# Patient Record
Sex: MALE | Race: WHITE | NOT HISPANIC OR LATINO | Employment: FULL TIME | URBAN - METROPOLITAN AREA
[De-identification: names, ages, dates, MRNs, and addresses within clinical notes are randomized per-mention and may not be internally consistent; named-entity substitution may affect disease eponyms.]

---

## 2017-05-22 ENCOUNTER — HOSPITAL ENCOUNTER (EMERGENCY)
Facility: HOSPITAL | Age: 22
Discharge: HOME | End: 2017-05-22
Attending: EMERGENCY MEDICINE | Admitting: EMERGENCY MEDICINE

## 2017-05-22 ENCOUNTER — APPOINTMENT (OUTPATIENT)
Dept: CT IMAGING | Facility: HOSPITAL | Age: 22
End: 2017-05-22

## 2017-05-22 VITALS
SYSTOLIC BLOOD PRESSURE: 120 MMHG | TEMPERATURE: 98.2 F | RESPIRATION RATE: 16 BRPM | OXYGEN SATURATION: 100 % | BODY MASS INDEX: 26.51 KG/M2 | HEART RATE: 61 BPM | DIASTOLIC BLOOD PRESSURE: 81 MMHG | WEIGHT: 200 LBS | HEIGHT: 73 IN

## 2017-05-22 DIAGNOSIS — K62.5 BRBPR (BRIGHT RED BLOOD PER RECTUM): Primary | ICD-10-CM

## 2017-05-22 DIAGNOSIS — R19.5 HEME POSITIVE STOOL: ICD-10-CM

## 2017-05-22 LAB
ALBUMIN SERPL-MCNC: 4.7 G/DL (ref 3.2–4.8)
ALBUMIN/GLOB SERPL: 1.6 G/DL (ref 1.5–2.5)
ALP SERPL-CCNC: 60 U/L (ref 25–100)
ALT SERPL W P-5'-P-CCNC: 14 U/L (ref 7–40)
ANION GAP SERPL CALCULATED.3IONS-SCNC: 3 MMOL/L (ref 3–11)
AST SERPL-CCNC: 20 U/L (ref 0–33)
BASOPHILS # BLD AUTO: 0.01 10*3/MM3 (ref 0–0.2)
BASOPHILS NFR BLD AUTO: 0.1 % (ref 0–1)
BILIRUB SERPL-MCNC: 0.5 MG/DL (ref 0.3–1.2)
BUN BLD-MCNC: 18 MG/DL (ref 9–23)
BUN/CREAT SERPL: 15 (ref 7–25)
CALCIUM SPEC-SCNC: 10.1 MG/DL (ref 8.7–10.4)
CHLORIDE SERPL-SCNC: 106 MMOL/L (ref 99–109)
CO2 SERPL-SCNC: 32 MMOL/L (ref 20–31)
CREAT BLD-MCNC: 1.2 MG/DL (ref 0.6–1.3)
DEPRECATED RDW RBC AUTO: 41.8 FL (ref 37–54)
DEVELOPER EXPIRATION DATE: ABNORMAL
DEVELOPER LOT NUMBER: ABNORMAL
EOSINOPHIL # BLD AUTO: 0.11 10*3/MM3 (ref 0.1–0.3)
EOSINOPHIL NFR BLD AUTO: 1.5 % (ref 0–3)
ERYTHROCYTE [DISTWIDTH] IN BLOOD BY AUTOMATED COUNT: 13.1 % (ref 11.3–14.5)
EXPIRATION DATE: ABNORMAL
FECAL OCCULT BLOOD SCREEN, POC: POSITIVE
GFR SERPL CREATININE-BSD FRML MDRD: 74 ML/MIN/1.73
GLOBULIN UR ELPH-MCNC: 2.9 GM/DL
GLUCOSE BLD-MCNC: 89 MG/DL (ref 70–100)
HCT VFR BLD AUTO: 45.1 % (ref 38.9–50.9)
HGB BLD-MCNC: 15.3 G/DL (ref 13.1–17.5)
HOLD SPECIMEN: NORMAL
HOLD SPECIMEN: NORMAL
IMM GRANULOCYTES # BLD: 0 10*3/MM3 (ref 0–0.03)
IMM GRANULOCYTES NFR BLD: 0 % (ref 0–0.6)
LIPASE SERPL-CCNC: 37 U/L (ref 6–51)
LYMPHOCYTES # BLD AUTO: 1.97 10*3/MM3 (ref 0.6–4.8)
LYMPHOCYTES NFR BLD AUTO: 27.6 % (ref 24–44)
Lab: ABNORMAL
MCH RBC QN AUTO: 29.6 PG (ref 27–31)
MCHC RBC AUTO-ENTMCNC: 33.9 G/DL (ref 32–36)
MCV RBC AUTO: 87.2 FL (ref 80–99)
MONOCYTES # BLD AUTO: 0.49 10*3/MM3 (ref 0–1)
MONOCYTES NFR BLD AUTO: 6.9 % (ref 0–12)
NEGATIVE CONTROL: NEGATIVE
NEUTROPHILS # BLD AUTO: 4.56 10*3/MM3 (ref 1.5–8.3)
NEUTROPHILS NFR BLD AUTO: 63.9 % (ref 41–71)
PLATELET # BLD AUTO: 235 10*3/MM3 (ref 150–450)
PMV BLD AUTO: 9.7 FL (ref 6–12)
POSITIVE CONTROL: POSITIVE
POTASSIUM BLD-SCNC: 3.9 MMOL/L (ref 3.5–5.5)
PROT SERPL-MCNC: 7.6 G/DL (ref 5.7–8.2)
RBC # BLD AUTO: 5.17 10*6/MM3 (ref 4.2–5.76)
SODIUM BLD-SCNC: 141 MMOL/L (ref 132–146)
WBC NRBC COR # BLD: 7.14 10*3/MM3 (ref 3.5–10.8)
WHOLE BLOOD HOLD SPECIMEN: NORMAL
WHOLE BLOOD HOLD SPECIMEN: NORMAL

## 2017-05-22 PROCEDURE — 83690 ASSAY OF LIPASE: CPT | Performed by: EMERGENCY MEDICINE

## 2017-05-22 PROCEDURE — 99283 EMERGENCY DEPT VISIT LOW MDM: CPT

## 2017-05-22 PROCEDURE — 85025 COMPLETE CBC W/AUTO DIFF WBC: CPT | Performed by: EMERGENCY MEDICINE

## 2017-05-22 PROCEDURE — 74177 CT ABD & PELVIS W/CONTRAST: CPT

## 2017-05-22 PROCEDURE — 0 IOPAMIDOL 61 % SOLUTION: Performed by: EMERGENCY MEDICINE

## 2017-05-22 PROCEDURE — 80053 COMPREHEN METABOLIC PANEL: CPT | Performed by: EMERGENCY MEDICINE

## 2017-05-22 RX ORDER — SODIUM CHLORIDE 0.9 % (FLUSH) 0.9 %
10 SYRINGE (ML) INJECTION AS NEEDED
Status: DISCONTINUED | OUTPATIENT
Start: 2017-05-22 | End: 2017-05-22 | Stop reason: HOSPADM

## 2017-05-22 RX ADMIN — IOPAMIDOL 95 ML: 612 INJECTION, SOLUTION INTRAVENOUS at 19:06

## 2017-05-26 ENCOUNTER — OFFICE VISIT (OUTPATIENT)
Dept: GASTROENTEROLOGY | Facility: CLINIC | Age: 22
End: 2017-05-26

## 2017-05-26 ENCOUNTER — APPOINTMENT (OUTPATIENT)
Dept: LAB | Facility: HOSPITAL | Age: 22
End: 2017-05-26

## 2017-05-26 VITALS
TEMPERATURE: 98.1 F | BODY MASS INDEX: 26.64 KG/M2 | OXYGEN SATURATION: 99 % | HEART RATE: 68 BPM | DIASTOLIC BLOOD PRESSURE: 88 MMHG | HEIGHT: 73 IN | WEIGHT: 201 LBS | SYSTOLIC BLOOD PRESSURE: 130 MMHG

## 2017-05-26 DIAGNOSIS — Z80.0 FAMILY HISTORY OF COLON CANCER: ICD-10-CM

## 2017-05-26 DIAGNOSIS — K92.1 BLOODY STOOL: ICD-10-CM

## 2017-05-26 DIAGNOSIS — Z72.0 CURRENT TOBACCO USE: ICD-10-CM

## 2017-05-26 DIAGNOSIS — Z83.79 FAMILY HISTORY OF CROHN'S DISEASE: ICD-10-CM

## 2017-05-26 DIAGNOSIS — Z79.1 NSAID LONG-TERM USE: ICD-10-CM

## 2017-05-26 LAB
CRP SERPL-MCNC: 0.02 MG/DL (ref 0–1)
FERRITIN SERPL-MCNC: 26 NG/ML (ref 22–322)
IRON 24H UR-MRATE: 92 MCG/DL (ref 50–175)
IRON SATN MFR SERPL: 26 % (ref 20–50)
TIBC SERPL-MCNC: 349 MCG/DL (ref 250–450)

## 2017-05-26 PROCEDURE — 36415 COLL VENOUS BLD VENIPUNCTURE: CPT | Performed by: NURSE PRACTITIONER

## 2017-05-26 PROCEDURE — 86140 C-REACTIVE PROTEIN: CPT | Performed by: NURSE PRACTITIONER

## 2017-05-26 PROCEDURE — 83550 IRON BINDING TEST: CPT | Performed by: NURSE PRACTITIONER

## 2017-05-26 PROCEDURE — 83540 ASSAY OF IRON: CPT | Performed by: NURSE PRACTITIONER

## 2017-05-26 PROCEDURE — 99215 OFFICE O/P EST HI 40 MIN: CPT | Performed by: NURSE PRACTITIONER

## 2017-05-26 PROCEDURE — 82728 ASSAY OF FERRITIN: CPT | Performed by: NURSE PRACTITIONER

## 2017-05-26 RX ORDER — PANTOPRAZOLE SODIUM 40 MG/1
40 TABLET, DELAYED RELEASE ORAL DAILY
Qty: 30 TABLET | Refills: 2 | Status: SHIPPED | OUTPATIENT
Start: 2017-05-26 | End: 2020-11-12

## 2017-05-31 ENCOUNTER — LAB REQUISITION (OUTPATIENT)
Dept: LAB | Facility: HOSPITAL | Age: 22
End: 2017-05-31

## 2017-05-31 DIAGNOSIS — K92.1 MELENA: ICD-10-CM

## 2017-05-31 LAB — C DIFF TOX GENS STL QL NAA+PROBE: NOT DETECTED

## 2017-05-31 PROCEDURE — 83631 LACTOFERRIN FECAL (QUANT): CPT | Performed by: NURSE PRACTITIONER

## 2017-05-31 PROCEDURE — 87493 C DIFF AMPLIFIED PROBE: CPT | Performed by: NURSE PRACTITIONER

## 2017-06-08 ENCOUNTER — OFFICE VISIT (OUTPATIENT)
Dept: FAMILY MEDICINE CLINIC | Facility: CLINIC | Age: 22
End: 2017-06-08

## 2017-06-08 VITALS
HEIGHT: 73 IN | DIASTOLIC BLOOD PRESSURE: 72 MMHG | TEMPERATURE: 98.2 F | WEIGHT: 201 LBS | SYSTOLIC BLOOD PRESSURE: 121 MMHG | BODY MASS INDEX: 26.64 KG/M2 | OXYGEN SATURATION: 99 % | HEART RATE: 67 BPM

## 2017-06-08 DIAGNOSIS — K62.5 RECTAL BLEEDING: Primary | ICD-10-CM

## 2017-06-08 DIAGNOSIS — Z71.6 ENCOUNTER FOR SMOKING CESSATION COUNSELING: ICD-10-CM

## 2017-06-08 LAB — LACTOFERRIN SER-MCNC: 1.28 UG/ML(G) (ref 0–7.24)

## 2017-06-08 PROCEDURE — 99407 BEHAV CHNG SMOKING > 10 MIN: CPT | Performed by: INTERNAL MEDICINE

## 2017-06-08 PROCEDURE — 99203 OFFICE O/P NEW LOW 30 MIN: CPT | Performed by: INTERNAL MEDICINE

## 2017-06-08 RX ORDER — NICOTINE 21 MG/24HR
1 PATCH, TRANSDERMAL 24 HOURS TRANSDERMAL EVERY 24 HOURS
Qty: 21 PATCH | Refills: 1 | Status: SHIPPED | OUTPATIENT
Start: 2017-06-08 | End: 2018-11-26

## 2017-06-08 RX ORDER — IBUPROFEN 200 MG
200 TABLET ORAL EVERY 6 HOURS PRN
COMMUNITY
End: 2018-11-26

## 2017-06-08 NOTE — PROGRESS NOTES
Subjective   Yonis Lemon is a 22 y.o. male.     Chief Complaint   Patient presents with   • Establish Care     check up       History of Present Illness   Patient is here to establish primary care. He c/o rectal bleeding with bowel movements for a duration of a month, which is worsening, associated with abdominal cramps. Denies diarrhea, constipation, nausea, vomiting. Denies fever, chills.   The following portions of the patient's history were reviewed and updated as appropriate: allergies, current medications, past family history, past medical history, past social history, past surgical history and problem list.    Past Medical History:   Diagnosis Date   • Fractures    • Hypertension        Past Surgical History:   Procedure Laterality Date   • WISDOM TOOTH EXTRACTION     • WISDOM TOOTH EXTRACTION         Current Outpatient Prescriptions on File Prior to Visit   Medication Sig Dispense Refill   • pantoprazole (PROTONIX) 40 MG EC tablet Take 1 tablet by mouth Daily. Take first thing in the morning on an empty stomach.  Wait at least 30 min to 1hr before eating. 30 tablet 2     No current facility-administered medications on file prior to visit.        Social History     Social History   • Marital status: Significant Other     Spouse name: N/A   • Number of children: N/A   • Years of education: N/A     Occupational History   • Not on file.     Social History Main Topics   • Smoking status: Never Smoker   • Smokeless tobacco: Current User   • Alcohol use Yes   • Drug use: No   • Sexual activity: Defer     Other Topics Concern   • Not on file     Social History Narrative       Review of Systems   Constitutional: Negative for chills, fatigue and fever.   HENT: Negative for congestion, ear pain, rhinorrhea, sinus pressure and sore throat.    Eyes: Negative for visual disturbance.   Respiratory: Negative for cough, chest tightness, shortness of breath and wheezing.    Cardiovascular: Negative for chest pain, palpitations  "and leg swelling.   Gastrointestinal: Positive for blood in stool. Negative for abdominal pain, constipation, diarrhea, nausea and vomiting.   Endocrine: Negative for polydipsia and polyuria.   Genitourinary: Negative for dysuria and hematuria.   Musculoskeletal: Negative for back pain.   Skin: Negative for rash.   Neurological: Negative for dizziness, light-headedness, numbness and headaches.   Psychiatric/Behavioral: Negative for dysphoric mood and sleep disturbance. The patient is not nervous/anxious.        Objective   Blood pressure 121/72, pulse 67, temperature 98.2 °F (36.8 °C), height 73\" (185.4 cm), weight 201 lb (91.2 kg), SpO2 99 %.    Physical Exam   Constitutional: He is oriented to person, place, and time. He appears well-nourished. No distress.   HENT:   Head: Atraumatic.   Right Ear: External ear normal.   Left Ear: External ear normal.   Eyes: Conjunctivae are normal. Right eye exhibits no discharge. Left eye exhibits no discharge.   Cardiovascular: Normal rate and regular rhythm.    No murmur heard.  Pulmonary/Chest: Effort normal and breath sounds normal. He has no wheezes. He has no rales.   Abdominal: Soft. Bowel sounds are normal. He exhibits no distension. There is no tenderness.   Neurological: He is alert and oriented to person, place, and time.   Skin: No rash noted. He is not diaphoretic.   Psychiatric: He has a normal mood and affect.   Nursing note and vitals reviewed.    Assessment/Plan   Yonis was seen today for establish care.    Diagnoses and all orders for this visit:    Rectal bleeding  -     Ambulatory Referral to Gastroenterology    Encounter for smoking cessation counseling  -     nicotine ([employer] NICOTINE TRANSDERMAL SYS) 14 MG/24HR patch; Place 1 patch on the skin Daily.    Time spent on smoking cessation counselin mins.         Return in about 4 weeks (around 2017).    There are no Patient Instructions on file for this visit.  "

## 2017-06-15 ENCOUNTER — OUTSIDE FACILITY SERVICE (OUTPATIENT)
Dept: GASTROENTEROLOGY | Facility: CLINIC | Age: 22
End: 2017-06-15

## 2017-06-15 PROCEDURE — 45378 DIAGNOSTIC COLONOSCOPY: CPT | Performed by: INTERNAL MEDICINE

## 2018-11-26 ENCOUNTER — OFFICE VISIT (OUTPATIENT)
Dept: FAMILY MEDICINE CLINIC | Facility: CLINIC | Age: 23
End: 2018-11-26

## 2018-11-26 VITALS
BODY MASS INDEX: 28.63 KG/M2 | HEIGHT: 73 IN | WEIGHT: 216 LBS | SYSTOLIC BLOOD PRESSURE: 118 MMHG | HEART RATE: 68 BPM | DIASTOLIC BLOOD PRESSURE: 82 MMHG | OXYGEN SATURATION: 97 %

## 2018-11-26 DIAGNOSIS — R53.83 FATIGUE, UNSPECIFIED TYPE: ICD-10-CM

## 2018-11-26 DIAGNOSIS — R63.1 POLYDIPSIA: ICD-10-CM

## 2018-11-26 DIAGNOSIS — Z83.3 FAMILY HISTORY OF DIABETES MELLITUS (DM): ICD-10-CM

## 2018-11-26 DIAGNOSIS — Z00.00 ANNUAL PHYSICAL EXAM: ICD-10-CM

## 2018-11-26 DIAGNOSIS — Z76.89 ENCOUNTER TO ESTABLISH CARE: ICD-10-CM

## 2018-11-26 DIAGNOSIS — R63.5 WEIGHT GAIN: ICD-10-CM

## 2018-11-26 DIAGNOSIS — K64.9 HEMORRHOIDS, UNSPECIFIED HEMORRHOID TYPE: ICD-10-CM

## 2018-11-26 DIAGNOSIS — G47.00 INSOMNIA, UNSPECIFIED TYPE: ICD-10-CM

## 2018-11-26 DIAGNOSIS — Z83.79 FAMILY HISTORY OF CROHN'S DISEASE: ICD-10-CM

## 2018-11-26 DIAGNOSIS — Z00.00 WELL ADULT EXAM: ICD-10-CM

## 2018-11-26 PROCEDURE — 99385 PREV VISIT NEW AGE 18-39: CPT | Performed by: NURSE PRACTITIONER

## 2018-11-26 NOTE — PROGRESS NOTES
Subjective   Yonis Lemon is a 23 y.o. male.     Patient is here today to establish care with a new PCP. He has not really had a PCP, for many years. Patient states he went to a primary physician, Dr. Sorensen, about one year ago and they wrote him a prescription for nicotine patches,  that the insurance would not cover.  When he tried to contact the office for something different, no one ever got back with him, so he never returned. His only chronic issue is hemorrhoids.  Patient states he had a colonoscopy in 2017, due to family history of Crohn's in his father.  He was only diagnosed with hemorrhoids. He has had issues care at times with blood in his stool and when he goes to the ER for this, they tell him it is just from his hemorrhoids.  He states he has the blood in his stool once every week or two and has been occurring for a couple of years.  He occasionally has issues with constipation.  He states he is thirsty a lot but does not drink much water, he drinks a lot of coffee and energy drinks.  He eats whatever he wants and does not follow any particular type of diet.    Patient has acute complaints of insomnia and fatigue, for the past month.  He is very tired but when he lays down to sleep, he cannot fall asleep.  He denies having racing thoughts or distraction preventing him from falling asleep. He has never had problems sleeping in the past. He just feels so tired all the time, and figures it is from not sleeping well, but just wants to make sure. He is also concerned because he has gained 40-50 pounds in the past 6 months.  He states he does drink on the weekends and has about 6 beers but does not drink during the week. This is not new for him. He has not changed his diet or been any less active either.  He cannot think of anything new going on in his life in the past 6 months, that would cause him to gain weight, or have problems with sleep.  He does not feel depressed or anxious but  is very irritable at  "times,  and believes this is due to the lack of sleep.  He tried melatonin but it got to where he was having to take 3-4 of them to sleep and decided to stop using them at that point.  He denies family history of mood disorders or mental health issues.    He does not follow a healthy diet, he eats what he wants. He works in construction, and is active, but does not exercise regularly outside of his work.    He smokes 1 pack of cigarettes per day, and has for 8 years. He does drink some alcohol on the weekends, denies use of illegal drugs.             The following portions of the patient's history were reviewed and updated as appropriate: allergies, current medications, past family history, past medical history, past social history, past surgical history and problem list.    Review of Systems   Constitutional: Positive for fatigue and unexpected weight change. Negative for activity change, appetite change, chills, diaphoresis and fever.   HENT: Negative.    Eyes: Negative.    Respiratory: Negative.    Cardiovascular: Negative.    Gastrointestinal: Positive for blood in stool, constipation and rectal pain. Negative for abdominal distention, abdominal pain, anal bleeding, diarrhea, nausea and vomiting.   Genitourinary: Negative.    Musculoskeletal: Negative.    Skin: Negative.    Allergic/Immunologic: Negative.    Neurological: Negative for dizziness, syncope, weakness, numbness and headaches.   Hematological: Negative for adenopathy. Does not bruise/bleed easily.   Psychiatric/Behavioral: Positive for sleep disturbance. Negative for agitation, behavioral problems, confusion, decreased concentration, dysphoric mood, hallucinations, self-injury and suicidal ideas. The patient is not nervous/anxious and is not hyperactive.      Blood pressure 118/82, pulse 68, height 185.4 cm (73\"), weight 98 kg (216 lb), SpO2 97 %.  Objective   Physical Exam   Constitutional: He is oriented to person, place, and time. He appears " well-developed and well-nourished. No distress.   HENT:   Head: Normocephalic.   Right Ear: External ear normal.   Left Ear: External ear normal.   Nose: Nose normal.   Mouth/Throat: Oropharynx is clear and moist. No oropharyngeal exudate.   Eyes: Conjunctivae are normal. Pupils are equal, round, and reactive to light.   Neck: Normal range of motion. Neck supple. No tracheal deviation present. No thyromegaly present.   Cardiovascular: Normal rate, regular rhythm, normal heart sounds and intact distal pulses.   No murmur heard.  Pulmonary/Chest: Effort normal and breath sounds normal. No respiratory distress. He has no wheezes. He has no rales. He exhibits no tenderness.   Abdominal: Soft. Bowel sounds are normal. He exhibits no distension and no mass. There is no hepatosplenomegaly or splenomegaly. There is no tenderness. There is no rebound and no guarding. No hernia.   Musculoskeletal: Normal range of motion. He exhibits no edema or tenderness.   Lymphadenopathy:     He has no cervical adenopathy.        Right cervical: No superficial cervical, no deep cervical and no posterior cervical adenopathy present.       Left cervical: No superficial cervical, no deep cervical and no posterior cervical adenopathy present.   Neurological: He is alert and oriented to person, place, and time. Coordination and gait normal.   Skin: Skin is warm and dry. No rash noted.   Psychiatric: He has a normal mood and affect. His behavior is normal. Judgment and thought content normal.   Nursing note and vitals reviewed.      Assessment/Plan   Yonis was seen today for establish care and insomnia.    Diagnoses and all orders for this visit:    Encounter to establish care    Annual physical exam    Well adult exam    Hemorrhoids, unspecified hemorrhoid type    Family history of Crohn's disease    Fatigue, unspecified type  -     CBC (No Diff)  -     Comprehensive Metabolic Panel  -     TSH  -     Testosterone (Free & Total), LC / MS  -      Hemoglobin A1c    Weight gain  -     CBC (No Diff)  -     Comprehensive Metabolic Panel  -     TSH  -     Testosterone (Free & Total), LC / MS  -     Hemoglobin A1c    Insomnia, unspecified type  -     CBC (No Diff)  -     Comprehensive Metabolic Panel  -     TSH    Polydipsia  -     CBC (No Diff)  -     Comprehensive Metabolic Panel  -     Hemoglobin A1c    Family history of diabetes mellitus (DM)  -     CBC (No Diff)  -     Comprehensive Metabolic Panel  -     Hemoglobin A1c    Annual physical complete, with no abnormal findings.     Patient refuses to update vaccines. He is aware of the risks.     Labs obtained in clinic today, for screening of his symptoms. I will contact patient regarding test results and provide instructions regarding any necessary changes in plan of care.    Patient advised to stop drinking energy beverages, decrease his coffee intake and increase his water intake. Nutrition and activity goals reviewed including: mainly water to drink, limit white flour/processed sugar, high protein, high fiber carbs, good breakfast, working toward 150 mins cardio per week, resistance training 2x/week.     Patient aware of the risks of unprotected sex.    Began smoking age 15.  Smoked 1 ppd. He wanted to try to quit smoking in the past, but his insurance would not cover the Nicotine patches. Feels smoking helps stress.  Feels smoking cessation would benefit his health.  Barriers to smoking cessation include addiction.    Risks/benefits and potential side effects of various smoking cessation treatment options reviewed with patient.  Smoking cessation support options also reviewed with patient.  Patient voiced understanding and wishes to proceed with smoking.  A total of 7 minutes dedicated to smoking cessation counseling during this visit.    Patient was encouraged to keep me informed of any acute changes, lack of improvement, or any new concerning symptoms. Patient voiced understanding of all instructions and  denied further questions.    Patient to RTC pending lab results and prn.          No orders of the defined types were placed in this encounter.

## 2018-11-29 DIAGNOSIS — R79.89 LOW TESTOSTERONE LEVEL IN MALE: Primary | ICD-10-CM

## 2018-11-29 LAB
ALBUMIN SERPL-MCNC: 4.6 G/DL (ref 3.5–5.5)
ALBUMIN/GLOB SERPL: 1.8 {RATIO} (ref 1.2–2.2)
ALP SERPL-CCNC: 74 IU/L (ref 39–117)
ALT SERPL-CCNC: 20 IU/L (ref 0–44)
AST SERPL-CCNC: 20 IU/L (ref 0–40)
BILIRUB SERPL-MCNC: <0.2 MG/DL (ref 0–1.2)
BUN SERPL-MCNC: 14 MG/DL (ref 6–20)
BUN/CREAT SERPL: 10 (ref 9–20)
CALCIUM SERPL-MCNC: 9.6 MG/DL (ref 8.7–10.2)
CHLORIDE SERPL-SCNC: 103 MMOL/L (ref 96–106)
CO2 SERPL-SCNC: 25 MMOL/L (ref 20–29)
CREAT SERPL-MCNC: 1.46 MG/DL (ref 0.76–1.27)
ERYTHROCYTE [DISTWIDTH] IN BLOOD BY AUTOMATED COUNT: 13.2 % (ref 12.3–15.4)
GLOBULIN SER CALC-MCNC: 2.5 G/DL (ref 1.5–4.5)
GLUCOSE SERPL-MCNC: 93 MG/DL (ref 65–99)
HBA1C MFR BLD: 5 % (ref 4.8–5.6)
HCT VFR BLD AUTO: 45.3 % (ref 37.5–51)
HGB BLD-MCNC: 15.6 G/DL (ref 13–17.7)
Lab: NORMAL
MCH RBC QN AUTO: 28.7 PG (ref 26.6–33)
MCHC RBC AUTO-ENTMCNC: 34.4 G/DL (ref 31.5–35.7)
MCV RBC AUTO: 83 FL (ref 79–97)
PLATELET # BLD AUTO: 257 X10E3/UL (ref 150–379)
POTASSIUM SERPL-SCNC: 4.4 MMOL/L (ref 3.5–5.2)
PROT SERPL-MCNC: 7.1 G/DL (ref 6–8.5)
RBC # BLD AUTO: 5.43 X10E6/UL (ref 4.14–5.8)
SODIUM SERPL-SCNC: 143 MMOL/L (ref 134–144)
TESTOST FREE SERPL-MCNC: 9.1 PG/ML (ref 9.3–26.5)
TESTOST SERPL-MCNC: 387.1 NG/DL (ref 264–916)
TSH SERPL DL<=0.005 MIU/L-ACNC: 1.41 UIU/ML (ref 0.45–4.5)
WBC # BLD AUTO: 8.3 X10E3/UL (ref 3.4–10.8)

## 2018-12-04 ENCOUNTER — RESULTS ENCOUNTER (OUTPATIENT)
Dept: FAMILY MEDICINE CLINIC | Facility: CLINIC | Age: 23
End: 2018-12-04

## 2018-12-04 DIAGNOSIS — R79.89 LOW TESTOSTERONE LEVEL IN MALE: ICD-10-CM

## 2019-02-07 LAB
TESTOST FREE SERPL-MCNC: 18.3 PG/ML (ref 9.3–26.5)
TESTOST SERPL-MCNC: 492.5 NG/DL (ref 264–916)

## 2020-11-12 ENCOUNTER — OFFICE VISIT (OUTPATIENT)
Dept: FAMILY MEDICINE CLINIC | Facility: CLINIC | Age: 25
End: 2020-11-12

## 2020-11-12 VITALS
OXYGEN SATURATION: 98 % | WEIGHT: 198 LBS | DIASTOLIC BLOOD PRESSURE: 71 MMHG | HEART RATE: 81 BPM | HEIGHT: 73 IN | SYSTOLIC BLOOD PRESSURE: 110 MMHG | BODY MASS INDEX: 26.24 KG/M2

## 2020-11-12 DIAGNOSIS — F43.10 PTSD (POST-TRAUMATIC STRESS DISORDER): ICD-10-CM

## 2020-11-12 DIAGNOSIS — F33.0 MILD EPISODE OF RECURRENT MAJOR DEPRESSIVE DISORDER (HCC): ICD-10-CM

## 2020-11-12 DIAGNOSIS — F41.9 ANXIETY: ICD-10-CM

## 2020-11-12 DIAGNOSIS — G47.00 INSOMNIA, UNSPECIFIED TYPE: ICD-10-CM

## 2020-11-12 PROCEDURE — 99214 OFFICE O/P EST MOD 30 MIN: CPT | Performed by: NURSE PRACTITIONER

## 2020-11-12 RX ORDER — TRAZODONE HYDROCHLORIDE 50 MG/1
50 TABLET ORAL NIGHTLY
Qty: 30 TABLET | Refills: 1 | Status: SHIPPED | OUTPATIENT
Start: 2020-11-12 | End: 2021-01-07

## 2020-11-12 NOTE — PROGRESS NOTES
"Lebron Lemon is a 25 y.o. male.     Patient has complaints of depression and mood swings for the past 6 months. He reports he has had depression and moodiness on and off, for most of his adult life, mostly due to PTSD. His last episode was a couple years ago. He has never taken medication for it though, has just used alcohol and marijuana. He stopped using alcohol several months ago though, but does use marijuana each night, to help him sleep. His mom's side of the family has some depression, but he does not know if anyone takes any medication. He denies any suicidal thoughts.        The following portions of the patient's history were reviewed and updated as appropriate: allergies, current medications, past family history, past medical history, past social history, past surgical history and problem list.    Review of Systems   Constitutional: Negative.    HENT: Negative.    Eyes: Negative.    Respiratory: Negative.    Cardiovascular: Negative.    Gastrointestinal: Negative.    Genitourinary: Negative.    Musculoskeletal: Negative.    Skin: Negative.    Neurological: Negative for dizziness, syncope, weakness and numbness.   Hematological: Negative for adenopathy.   Psychiatric/Behavioral: Positive for dysphoric mood and sleep disturbance. Negative for confusion and suicidal ideas. The patient is nervous/anxious.      Vitals:    11/12/20 1446   BP: 110/71   Pulse: 81   SpO2: 98%   Weight: 89.8 kg (198 lb)   Height: 185.4 cm (73\")     Objective   Physical Exam  Vitals signs and nursing note reviewed.   Constitutional:       General: He is not in acute distress.     Appearance: He is well-developed.   HENT:      Head: Normocephalic.      Right Ear: External ear normal.      Left Ear: External ear normal.      Nose: Nose normal.      Mouth/Throat:      Pharynx: No oropharyngeal exudate.   Eyes:      Conjunctiva/sclera: Conjunctivae normal.      Pupils: Pupils are equal, round, and reactive to light.   Neck: "      Musculoskeletal: Normal range of motion and neck supple.      Thyroid: No thyromegaly.      Trachea: No tracheal deviation.   Cardiovascular:      Rate and Rhythm: Normal rate and regular rhythm.      Heart sounds: Normal heart sounds. No murmur.   Pulmonary:      Effort: Pulmonary effort is normal. No respiratory distress.      Breath sounds: Normal breath sounds. No wheezing or rales.   Chest:      Chest wall: No tenderness.   Abdominal:      General: Bowel sounds are normal. There is no distension.      Palpations: Abdomen is soft. There is no splenomegaly or mass.      Tenderness: There is no abdominal tenderness. There is no guarding or rebound.      Hernia: No hernia is present.   Musculoskeletal: Normal range of motion.         General: No tenderness.   Lymphadenopathy:      Cervical: No cervical adenopathy.      Right cervical: No superficial, deep or posterior cervical adenopathy.     Left cervical: No superficial, deep or posterior cervical adenopathy.   Skin:     General: Skin is warm and dry.      Findings: No rash.   Neurological:      Mental Status: He is alert and oriented to person, place, and time.      Coordination: Coordination normal.      Gait: Gait normal.   Psychiatric:         Mood and Affect: Mood normal.         Speech: Speech normal.         Behavior: Behavior normal.         Thought Content: Thought content normal.         Cognition and Memory: Cognition normal.         Judgment: Judgment normal.         Assessment/Plan   Diagnoses and all orders for this visit:    1. Mild episode of recurrent major depressive disorder (CMS/HCC)  -     sertraline (Zoloft) 50 MG tablet; Take 1 tablet by mouth Daily for 30 days.  Dispense: 30 tablet; Refill: 1  -     traZODone (DESYREL) 50 MG tablet; Take 1 tablet by mouth Every Night for 30 days.  Dispense: 30 tablet; Refill: 1    2. PTSD (post-traumatic stress disorder)  -     sertraline (Zoloft) 50 MG tablet; Take 1 tablet by mouth Daily for 30  days.  Dispense: 30 tablet; Refill: 1  -     traZODone (DESYREL) 50 MG tablet; Take 1 tablet by mouth Every Night for 30 days.  Dispense: 30 tablet; Refill: 1    3. Anxiety  -     sertraline (Zoloft) 50 MG tablet; Take 1 tablet by mouth Daily for 30 days.  Dispense: 30 tablet; Refill: 1  -     traZODone (DESYREL) 50 MG tablet; Take 1 tablet by mouth Every Night for 30 days.  Dispense: 30 tablet; Refill: 1    4. Insomnia, unspecified type  -     sertraline (Zoloft) 50 MG tablet; Take 1 tablet by mouth Daily for 30 days.  Dispense: 30 tablet; Refill: 1  -     traZODone (DESYREL) 50 MG tablet; Take 1 tablet by mouth Every Night for 30 days.  Dispense: 30 tablet; Refill: 1      MDD/PTSD/Anxiety/Insomnia  -Start Zoloft 50 mg daily. Patient advised to take 1/2 tab for the first several days, then increase to whole tab. Start Trazodone 50 mg qhs. Advised patient to take 1/2 tab for the first 3 nights, and if not effective to help him sleep, increase to whole tablet. He may increase to 100 mg, but not increase more before follow up appointment. He was educated to avoid alcohol and marijuana with these medications.Patient voiced understanding of all instructions and reported he would do this. Patient was encouraged to keep me informed of any acute changes, lack of improvement, or any new concerning symptoms. He was advised to stop medication immediately, for any worsening, depression, anxiety or PTSD.    Patient to RTC in 6 weeks or sooner if needed.

## 2021-01-07 ENCOUNTER — OFFICE VISIT (OUTPATIENT)
Dept: FAMILY MEDICINE CLINIC | Facility: CLINIC | Age: 26
End: 2021-01-07

## 2021-01-07 DIAGNOSIS — G47.00 INSOMNIA, UNSPECIFIED TYPE: ICD-10-CM

## 2021-01-07 DIAGNOSIS — F41.9 ANXIETY: ICD-10-CM

## 2021-01-07 DIAGNOSIS — F33.0 MILD EPISODE OF RECURRENT MAJOR DEPRESSIVE DISORDER (HCC): ICD-10-CM

## 2021-01-07 DIAGNOSIS — F43.10 PTSD (POST-TRAUMATIC STRESS DISORDER): ICD-10-CM

## 2021-01-07 PROBLEM — G47.09 OTHER INSOMNIA: Status: ACTIVE | Noted: 2021-01-07

## 2021-01-07 PROCEDURE — 99441 PR PHYS/QHP TELEPHONE EVALUATION 5-10 MIN: CPT | Performed by: NURSE PRACTITIONER

## 2021-01-07 NOTE — PROGRESS NOTES
Subjective   Yonis Lemon is a 25 y.o. male.     Visit performed via telephone and verbal consent given.    Follow up for anxiety, PTSD and insomnia.  He reports he has been taking his Zoloft every day as directed, takes it at bedtime, and is tolerating it well with no adverse side effects.  He reports that he only takes half of trazodone and it helps him sleep very well but he feels tired for several hours when he wakes up.  He reports only getting 5 hours of sleep per night.  He is happy with the medications and he and his wife can definitely tell a difference.  He does not get angry and fly off the handle is easy.  He does feel like the medication could work a little better though but understands he is only been on it for 6 weeks       The following portions of the patient's history were reviewed and updated as appropriate: allergies, current medications, past family history, past medical history, past social history, past surgical history and problem list.    Review of Systems   Constitutional: Negative.    HENT: Negative.    Eyes: Negative.    Respiratory: Negative.    Cardiovascular: Negative.    Gastrointestinal: Negative.    Genitourinary: Negative.    Musculoskeletal: Negative.    Skin: Negative.    Allergic/Immunologic: Negative.    Neurological: Negative for dizziness, syncope, weakness and numbness.   Hematological: Negative for adenopathy.   Psychiatric/Behavioral: Negative for confusion and suicidal ideas. The patient is not nervous/anxious.         Anxiety, depression, PTSD and insomnia much improved       Objective   Physical Exam    Physical exam not performed due to telephone encounter      Assessment/Plan   Diagnoses and all orders for this visit:    1. PTSD (post-traumatic stress disorder)  -     sertraline (Zoloft) 50 MG tablet; Take 1 tablet by mouth Daily for 30 days.  Dispense: 30 tablet; Refill: 2    2. Anxiety  -     sertraline (Zoloft) 50 MG tablet; Take 1 tablet by mouth Daily for 30 days.   Dispense: 30 tablet; Refill: 2    3. Mild episode of recurrent major depressive disorder (CMS/HCC)  -     sertraline (Zoloft) 50 MG tablet; Take 1 tablet by mouth Daily for 30 days.  Dispense: 30 tablet; Refill: 2    4. Insomnia, unspecified type  -     sertraline (Zoloft) 50 MG tablet; Take 1 tablet by mouth Daily for 30 days.  Dispense: 30 tablet; Refill: 2      Patient advised to continue Zoloft 50 mg for 4 more weeks, and if he still felt that medication needed to be stronger to call the office for an increase.  If he is doing well we will follow-up in 3 months.  He agreed to this.  Advised patient to try melatonin for sleep, but no more than 10 mg since the trazodone makes him drowsy the next morning.  Did educate patient though that any sleeping medication he takes will make him feel drowsy in the morning if he is only getting 5 hours of sleep.  He needs to get at least 8 hours of sleep if he is going to take sleeping medication.  He voiced understanding.    Patient was encouraged to keep me informed of any acute changes, lack of improvement, or any new concerning symptoms.    Patient is aware to stop medication immediately and seek emergency help if he has any worsening depression or thoughts of harming himself.    10 minutes spent on telephone encounter today.

## 2021-04-01 DIAGNOSIS — F33.0 MILD EPISODE OF RECURRENT MAJOR DEPRESSIVE DISORDER (HCC): ICD-10-CM

## 2021-04-01 DIAGNOSIS — F43.10 PTSD (POST-TRAUMATIC STRESS DISORDER): ICD-10-CM

## 2021-04-01 DIAGNOSIS — F41.9 ANXIETY: ICD-10-CM

## 2021-04-01 DIAGNOSIS — G47.00 INSOMNIA, UNSPECIFIED TYPE: ICD-10-CM

## 2021-05-18 ENCOUNTER — TELEPHONE (OUTPATIENT)
Dept: FAMILY MEDICINE CLINIC | Facility: CLINIC | Age: 26
End: 2021-05-18

## 2021-05-18 DIAGNOSIS — F43.10 PTSD (POST-TRAUMATIC STRESS DISORDER): Primary | ICD-10-CM

## 2021-05-18 DIAGNOSIS — F41.9 ANXIETY: ICD-10-CM

## 2021-05-18 DIAGNOSIS — F33.0 MILD EPISODE OF RECURRENT MAJOR DEPRESSIVE DISORDER (HCC): ICD-10-CM

## 2021-05-18 RX ORDER — SERTRALINE HYDROCHLORIDE 100 MG/1
100 TABLET, FILM COATED ORAL DAILY
Qty: 30 TABLET | Refills: 1 | Status: SHIPPED | OUTPATIENT
Start: 2021-05-18 | End: 2021-06-17

## 2021-05-18 NOTE — TELEPHONE ENCOUNTER
Caller: Leatha Lemond    Relationship: Self    Best call back number: [phone] (H)    Medication needed:   sertraline (ZOLOFT) 50 MG tablet   0 ordered         Summary: Take 1 tablet by mouth once daily, Normal          When do you need the refill by: 5/21/21    What additional details did the patient provide when requesting the medication:   PATIENT STATES THAT HE HAS ABOUT 5 TABLETS LEFT AND HAS BEEN ADVISED BY DR. PARSONS THAT IF HE NEEDS AN INCREASE IN THE DOSAGE TO LET HER KNOW. PATIENT IS REQUESTING A DOSAGE INCREASE CHANGE IN THIS MEDICATION.    Does the patient have less than a 3 day supply:  [] Yes  [x] No    What is the patient's preferred pharmacy:    [facility] [address], [place] - [address] [facility] AT [facility] [address] - [phone]  - [phone] FX    PATIENT HAS BEEN ADVISED TO ALLOW 48 HOURS FOR THE CLINICAL TEAM TO FOLLOW UP ON THIS REQUEST,  IF SYMPTOMS WORSENS TO SEEK OUT EMERGENT CARE. PATIENT  FULLY UNDERSTANDS.

## 2023-07-29 ENCOUNTER — HOSPITAL ENCOUNTER (EMERGENCY)
Facility: HOSPITAL | Age: 28
Discharge: HOME | End: 2023-07-29
Attending: EMERGENCY MEDICINE
Payer: COMMERCIAL

## 2023-07-29 ENCOUNTER — APPOINTMENT (OUTPATIENT)
Dept: CT IMAGING | Facility: HOSPITAL | Age: 28
End: 2023-07-29
Payer: COMMERCIAL

## 2023-07-29 ENCOUNTER — APPOINTMENT (OUTPATIENT)
Dept: GENERAL RADIOLOGY | Facility: HOSPITAL | Age: 28
End: 2023-07-29
Payer: COMMERCIAL

## 2023-07-29 VITALS
DIASTOLIC BLOOD PRESSURE: 93 MMHG | TEMPERATURE: 97.5 F | SYSTOLIC BLOOD PRESSURE: 133 MMHG | RESPIRATION RATE: 16 BRPM | WEIGHT: 175 LBS | OXYGEN SATURATION: 99 % | HEIGHT: 72 IN | HEART RATE: 68 BPM | BODY MASS INDEX: 23.7 KG/M2

## 2023-07-29 DIAGNOSIS — M23.92 ACUTE INTERNAL DERANGEMENT OF LEFT KNEE: Primary | ICD-10-CM

## 2023-07-29 DIAGNOSIS — S82.142A CLOSED FRACTURE OF LEFT TIBIAL PLATEAU, INITIAL ENCOUNTER: ICD-10-CM

## 2023-07-29 PROCEDURE — 99284 EMERGENCY DEPT VISIT MOD MDM: CPT

## 2023-07-29 PROCEDURE — 73560 X-RAY EXAM OF KNEE 1 OR 2: CPT

## 2023-07-29 PROCEDURE — 73700 CT LOWER EXTREMITY W/O DYE: CPT

## 2023-07-29 RX ORDER — OXYCODONE HYDROCHLORIDE AND ACETAMINOPHEN 5; 325 MG/1; MG/1
1 TABLET ORAL ONCE
Status: COMPLETED | OUTPATIENT
Start: 2023-07-29 | End: 2023-07-29

## 2023-07-29 RX ORDER — OXYCODONE HYDROCHLORIDE AND ACETAMINOPHEN 5; 325 MG/1; MG/1
1 TABLET ORAL EVERY 6 HOURS PRN
Qty: 12 TABLET | Refills: 0 | Status: SHIPPED | OUTPATIENT
Start: 2023-07-29 | End: 2023-08-02

## 2023-07-29 RX ADMIN — OXYCODONE HYDROCHLORIDE AND ACETAMINOPHEN 1 TABLET: 5; 325 TABLET ORAL at 17:47

## 2023-07-29 NOTE — Clinical Note
[facility] EMERGENCY DEPARTMENT  [address]  [facility] [address]  Phone: [phone]    Yonis Lemon was seen and treated in our emergency department on 7/29/2023.  He may return to work on 08/04/2023.  Nonweightbearing most use crutches until cleared by orthopedics       Thank you for choosing [facility].    Blu Jeffers PA

## 2023-07-29 NOTE — ED PROVIDER NOTES
Subjective   History of Present Illness  28-year-old male presents to the emergency department today complaining of left knee pain.  He was at one of the trampoline facilities with his son and his son asked him to go jump and he jumped a couple times bounced off the side and came back down and injured the left knee.  States it went inward and it feels very unstable.  Has difficulty fully straightening it.  Denies any numbness or tingling no weakness no open wounds.  No other complaints.    History provided by:  Patient   used: No    Knee Pain  Location:  Knee  Time since incident:  1 hour  Injury: yes    Mechanism of injury comment:  Jumping on a trampoline.  Down awkwardly inverting the knee.  Knee location:  L knee  Pain details:     Quality:  Sharp and throbbing    Radiates to:  Does not radiate    Severity:  Severe    Onset quality:  Sudden    Duration:  1 hour    Timing:  Constant    Progression:  Unchanged  Chronicity:  New  Dislocation: no    Foreign body present:  No foreign bodies  Tetanus status:  Up to date  Prior injury to area:  No  Relieved by:  Immobilization  Worsened by:  Extension and bearing weight  Ineffective treatments:  None tried  Associated symptoms: decreased ROM, stiffness and swelling    Associated symptoms: no back pain, no fever, no itching, no muscle weakness, no neck pain, no numbness and no tingling    Risk factors: no frequent fractures, no known bone disorder, no obesity and no recent illness      Review of Systems   Constitutional:  Negative for chills and fever.   Respiratory:  Negative for chest tightness and wheezing.    Cardiovascular:  Negative for chest pain and palpitations.   Genitourinary:  Negative for dysuria and frequency.   Musculoskeletal:  Positive for stiffness. Negative for back pain and neck pain.   Skin:  Negative for itching, pallor and rash.   Hematological:  Negative for adenopathy.   Psychiatric/Behavioral: Negative.     All other systems  reviewed and are negative.    Past Medical History:   Diagnosis Date    Fractures     Hypertension        No Known Allergies    Past Surgical History:   Procedure Laterality Date    COLONOSCOPY  2017    WISDOM TOOTH EXTRACTION      WISDOM TOOTH EXTRACTION         Family History   Problem Relation Age of Onset    Diabetes Maternal Grandmother     Heart attack Maternal Grandmother     Diabetes Maternal Grandfather     Crohn's disease Father     Colon cancer Paternal Aunt     Colon cancer Paternal Uncle     Cancer Other         colon    Hypertension Mother     Heart attack Maternal Uncle        Social History     Socioeconomic History    Marital status: Significant Other   Tobacco Use    Smoking status: Every Day     Packs/day: 1.00     Types: Cigarettes    Smokeless tobacco: Current   Substance and Sexual Activity    Alcohol use: Yes     Alcohol/week: 1.0 standard drink     Types: 1 Cans of beer per week    Drug use: No    Sexual activity: Defer           Objective   Physical Exam  Vitals and nursing note reviewed.   Constitutional:       Appearance: He is well-developed.   HENT:      Head: Normocephalic and atraumatic.      Right Ear: External ear normal.      Left Ear: External ear normal.      Nose: Nose normal.   Eyes:      General: No scleral icterus.     Conjunctiva/sclera: Conjunctivae normal.      Pupils: Pupils are equal, round, and reactive to light.   Neck:      Thyroid: No thyromegaly.   Pulmonary:      Effort: Pulmonary effort is normal. No respiratory distress.   Musculoskeletal:         General: Normal range of motion.      Cervical back: Normal range of motion.      Comments: Left knee does have a positive ballottement there is some effusion.  No gross laxity but very guarded with any movement.  Unable to fully extend lacks 5 out 5 or 10 degrees.  Pulses are strong and equal sensations intact.   Lymphadenopathy:      Cervical: No cervical adenopathy.   Skin:     General: Skin is warm and dry.  "  Neurological:      Mental Status: He is alert and oriented to person, place, and time.      Cranial Nerves: No cranial nerve deficit.      Coordination: Coordination normal.      Deep Tendon Reflexes: Reflexes are normal and symmetric. Reflexes normal.   Psychiatric:         Behavior: Behavior normal.         Thought Content: Thought content normal.         Judgment: Judgment normal.       Procedures           ED Course                No results found for this or any previous visit (from the past 24 hour(s)).  Note: In addition to lab results from this visit, the labs listed above may include labs taken at another facility or during a different encounter within the last 24 hours. Please correlate lab times with ED admission and discharge times for further clarification of the services performed during this visit.    CT Lower Extremity Left Without Contrast   Final Result   Impression:   Nondisplaced fracture of the posterior medial tibial plateau.      Additional nondisplaced fracture of the posterior lateral tibial metaphysis.      Moderate associated lipohemarthrosis.            Electronically Signed: Nav Dsouza     7/29/2023 6:20 PM EDT     Workstation ID: [id number]      XR Knee 1 or 2 View Left   Final Result   Impression:   Cortical irregularity at the posterior tibial plateau suspicious for avulsion fracture. Recommend correlation with mechanism of patient injury, consider CT follow-up.      Electronically Signed: Reginald Royalr     7/29/2023 4:43 PM EDT     Workstation ID: [id number]        Vitals:    07/29/23 1607   BP: 133/93   BP Location: Right arm   Patient Position: Sitting   Pulse: 68   Resp: 16   Temp: 97.5 °F (36.4 °C)   TempSrc: Oral   SpO2: 99%   Weight: 79.4 kg (175 lb)   Height: 182.9 cm (72\")     Medications   oxyCODONE-acetaminophen (PERCOCET) 5-325 MG per tablet 1 tablet (1 tablet Oral Given 7/29/23 9167)     ECG/EMG Results (last 24 hours)       ** No results found for the last 24 hours. ** "          No orders to display                          [place] reviewed by Omar Bardales DO       Medical Decision Making  X-rays appear to show a questionable tibial plateau fracture.  They requested CT scan to further evaluate the injury.  He will be put on crutches nonweightbearing knee immobilizer follow-up with orthopedics.    Problems Addressed:  Acute internal derangement of left knee: complicated acute illness or injury  Closed fracture of left tibial plateau, initial encounter: complicated acute illness or injury    Amount and/or Complexity of Data Reviewed  Radiology: ordered and independent interpretation performed. Decision-making details documented in ED Course.    Risk  Prescription drug management.        Final diagnoses:   Acute internal derangement of left knee   Closed fracture of left tibial plateau, initial encounter       ED Disposition  ED Disposition       ED Disposition   Discharge    Condition   Stable    Comment   --               No follow-up provider specified.       Medication List        New Prescriptions      oxyCODONE-acetaminophen 5-325 MG per tablet  Commonly known as: PERCOCET  Take 1 tablet by mouth Every 6 (Six) Hours As Needed for Severe Pain.            Changed      traZODone 50 MG tablet  Commonly known as: DESYREL  Take 1 tablet by mouth Every Night for 30 days.  What changed: additional instructions            Stop      sertraline 100 MG tablet  Commonly known as: Zoloft               Where to Get Your Medications        These medications were sent to [id number] - [place], [place] - [address] AT [facility] [address] - [phone]  - [phone]   [address], [facility] [address]      Phone: [phone]   oxyCODONE-acetaminophen 5-325 MG per tablet            Blu Jeffers PA  07/30/23 1037

## 2023-08-02 ENCOUNTER — HOSPITAL ENCOUNTER (OUTPATIENT)
Dept: MRI IMAGING | Facility: HOSPITAL | Age: 28
Discharge: HOME | End: 2023-08-02
Payer: COMMERCIAL

## 2023-08-02 ENCOUNTER — OFFICE VISIT (OUTPATIENT)
Dept: ORTHOPEDIC SURGERY | Facility: CLINIC | Age: 28
End: 2023-08-02
Payer: COMMERCIAL

## 2023-08-02 VITALS
DIASTOLIC BLOOD PRESSURE: 72 MMHG | HEIGHT: 72 IN | WEIGHT: 175 LBS | BODY MASS INDEX: 23.7 KG/M2 | SYSTOLIC BLOOD PRESSURE: 108 MMHG

## 2023-08-02 DIAGNOSIS — S82.142A CLOSED FRACTURE OF LEFT TIBIAL PLATEAU, INITIAL ENCOUNTER: Primary | ICD-10-CM

## 2023-08-02 DIAGNOSIS — M25.562 ACUTE PAIN OF LEFT KNEE: ICD-10-CM

## 2023-08-02 DIAGNOSIS — M25.462 EFFUSION OF LEFT KNEE: ICD-10-CM

## 2023-08-02 DIAGNOSIS — S82.142A CLOSED FRACTURE OF LEFT TIBIAL PLATEAU, INITIAL ENCOUNTER: ICD-10-CM

## 2023-08-02 PROCEDURE — 73721 MRI JNT OF LWR EXTRE W/O DYE: CPT

## 2023-08-02 RX ORDER — OXYCODONE HYDROCHLORIDE AND ACETAMINOPHEN 5; 325 MG/1; MG/1
1 TABLET ORAL EVERY 6 HOURS PRN
Qty: 20 TABLET | Refills: 0 | Status: CANCELLED | OUTPATIENT
Start: 2023-08-02 | End: 2023-08-07

## 2023-08-02 RX ORDER — OXYCODONE HYDROCHLORIDE AND ACETAMINOPHEN 5; 325 MG/1; MG/1
1 TABLET ORAL EVERY 6 HOURS PRN
Qty: 20 TABLET | Refills: 0 | Status: SHIPPED | OUTPATIENT
Start: 2023-08-02 | End: 2023-08-02

## 2023-08-02 RX ORDER — OXYCODONE HYDROCHLORIDE AND ACETAMINOPHEN 5; 325 MG/1; MG/1
1 TABLET ORAL EVERY 6 HOURS PRN
Qty: 20 TABLET | Refills: 0 | Status: SHIPPED | OUTPATIENT
Start: 2023-08-02 | End: 2023-08-03

## 2023-08-03 ENCOUNTER — TELEPHONE (OUTPATIENT)
Dept: ORTHOPEDIC SURGERY | Facility: CLINIC | Age: 28
End: 2023-08-03
Payer: COMMERCIAL

## 2023-08-03 DIAGNOSIS — S82.142A CLOSED FRACTURE OF LEFT TIBIAL PLATEAU, INITIAL ENCOUNTER: Primary | ICD-10-CM

## 2023-08-03 RX ORDER — OXYCODONE HYDROCHLORIDE AND ACETAMINOPHEN 5; 325 MG/1; MG/1
1 TABLET ORAL EVERY 6 HOURS PRN
Qty: 20 TABLET | Refills: 0 | Status: CANCELLED | OUTPATIENT
Start: 2023-08-03 | End: 2023-08-08

## 2023-08-03 RX ORDER — OXYCODONE HYDROCHLORIDE AND ACETAMINOPHEN 5; 325 MG/1; MG/1
1 TABLET ORAL EVERY 6 HOURS PRN
Qty: 20 TABLET | Refills: 0 | Status: SHIPPED | OUTPATIENT
Start: 2023-08-03 | End: 2023-08-08

## 2023-08-04 ENCOUNTER — TELEPHONE (OUTPATIENT)
Dept: ORTHOPEDIC SURGERY | Facility: CLINIC | Age: 28
End: 2023-08-04

## 2023-08-04 ENCOUNTER — OFFICE VISIT (OUTPATIENT)
Dept: ORTHOPEDIC SURGERY | Facility: CLINIC | Age: 28
End: 2023-08-04
Payer: COMMERCIAL

## 2023-08-04 VITALS
DIASTOLIC BLOOD PRESSURE: 79 MMHG | BODY MASS INDEX: 23.7 KG/M2 | HEIGHT: 72 IN | SYSTOLIC BLOOD PRESSURE: 119 MMHG | WEIGHT: 175 LBS

## 2023-08-04 DIAGNOSIS — S83.242A ACUTE MEDIAL MENISCUS TEAR OF LEFT KNEE, INITIAL ENCOUNTER: ICD-10-CM

## 2023-08-04 DIAGNOSIS — S83.512A COMPLETE TEAR OF ANTERIOR CRUCIATE LIGAMENT OF LEFT KNEE, INITIAL ENCOUNTER: Primary | ICD-10-CM

## 2023-08-04 DIAGNOSIS — M25.562 ACUTE PAIN OF LEFT KNEE: ICD-10-CM

## 2023-08-04 DIAGNOSIS — M25.462 EFFUSION OF LEFT KNEE: ICD-10-CM

## 2023-08-04 PROBLEM — S82.142D CLOSED FRACTURE OF LEFT TIBIAL PLATEAU WITH ROUTINE HEALING: Status: ACTIVE | Noted: 2023-08-04

## 2023-08-07 ENCOUNTER — OFFICE VISIT (OUTPATIENT)
Dept: ORTHOPEDIC SURGERY | Facility: CLINIC | Age: 28
End: 2023-08-07
Payer: COMMERCIAL

## 2023-08-07 VITALS
BODY MASS INDEX: 23.7 KG/M2 | HEIGHT: 72 IN | DIASTOLIC BLOOD PRESSURE: 72 MMHG | SYSTOLIC BLOOD PRESSURE: 111 MMHG | WEIGHT: 175 LBS

## 2023-08-07 DIAGNOSIS — S83.242A ACUTE MEDIAL MENISCUS TEAR OF LEFT KNEE, INITIAL ENCOUNTER: ICD-10-CM

## 2023-08-07 DIAGNOSIS — M25.462 EFFUSION OF LEFT KNEE: ICD-10-CM

## 2023-08-07 DIAGNOSIS — S83.512A COMPLETE TEAR OF ANTERIOR CRUCIATE LIGAMENT OF LEFT KNEE, INITIAL ENCOUNTER: Primary | ICD-10-CM

## 2023-08-07 DIAGNOSIS — M24.562 FLEXION CONTRACTURE OF KNEE, LEFT: ICD-10-CM

## 2023-08-07 PROCEDURE — 99213 OFFICE O/P EST LOW 20 MIN: CPT | Performed by: ORTHOPAEDIC SURGERY

## 2023-08-07 NOTE — PROGRESS NOTES
[facility] Note        Subjective     Pain of the Left Knee (DOI: 7/29/23)      ADIS Lemon is a 28 y.o. male.  He injured his left knee on a trampoline on July 28.  He works for [employer] company.  He is on crutches.  He is in a brace.    Past Medical History:   Diagnosis Date    Fracture, finger     Dont remember time frame    Fracture, tibia and fibula 7/29/2023    Fractures     Hypertension       Past Surgical History:   Procedure Laterality Date    COLONOSCOPY  2017    WISDOM TOOTH EXTRACTION      WISDOM TOOTH EXTRACTION        Family History   Problem Relation Age of Onset    Diabetes Maternal Grandmother     Heart attack Maternal Grandmother     Diabetes Maternal Grandfather     Crohn's disease Father     Colon cancer Paternal Aunt     Colon cancer Paternal Uncle     Cancer Other         colon    Hypertension Mother     Heart attack Maternal Uncle      Social History     Socioeconomic History    Marital status: Single   Tobacco Use    Smoking status: Every Day     Packs/day: 1.00     Years: 5.00     Pack years: 5.00     Types: Cigarettes    Smokeless tobacco: Current   Substance and Sexual Activity    Alcohol use: Yes     Alcohol/week: 1.0 standard drink     Types: 1 Shots of liquor per week    Drug use: No    Sexual activity: Yes     Partners: Female     Birth control/protection: Vasectomy      Current Outpatient Medications on File Prior to Visit   Medication Sig Dispense Refill    Aspirin-Acetaminophen-Caffeine (EXCEDRIN PO) Take  by mouth.      oxyCODONE-acetaminophen (PERCOCET) 5-325 MG per tablet Take 1 tablet by mouth Every 6 (Six) Hours As Needed for Severe Pain for up to 5 days. 20 tablet 0    traZODone (DESYREL) 50 MG tablet Take 1 tablet by mouth Every Night for 30 days. (Patient taking differently: Take 50 mg by mouth Every Night. Only takes through the week.) 30 tablet 1     No current facility-administered medications on file prior to visit.      No Known Allergies  "      Review of Systems     I reviewed the patient's chief complaint, history of present illness, review of systems, past medical history, surgical history, family history, social history, medications and allergy list.        Objective      Physical Exam  /72   Ht 182.9 cm (72.01\")   Wt 79.4 kg (175 lb)   BMI 23.73 kg/mý     Body mass index is 23.73 kg/mý.    General  Mental Status - alert  General Appearance - cooperative, well groomed, not in acute distress  Orientation - Oriented X3  Build & Nutrition - well developed and well nourished  Posture - normal posture  Gait -antalgic gait on crutches.       Ortho Exam  Left knee lacks full extension.  Laxity of ACL.  Stable varus valgus.  Range of motion 10 to 60 degrees.    Imaging/Studies  Imaging Results (Last 24 Hours)       ** No results found for the last 24 hours. **          I viewed and personally interpreted his MRI from August 2 as complete ACL tear with bone bruise and medial meniscus tear.  Sprain of posterior lateral corner    Assessment    Assessment:  1. Complete tear of anterior cruciate ligament of left knee, initial encounter    2. Acute medial meniscus tear of left knee, initial encounter    3. Effusion of left knee        Plan:  Continue over-the-counter medication as needed for discomfort  He will do physical therapy in [place].  He needs to get full extension and the swelling of his knee prior to surgery.  I will see him back in 3 weeks.  ACL reconstruction after that.  Continue crutches until he can weight-bear without a limp.  He is off work        Avery Giang MD  08/07/23  13:21 EDT      Dictated Utilizing Dragon Dictation.    "

## 2023-08-18 ENCOUNTER — TELEPHONE (OUTPATIENT)
Dept: ORTHOPEDIC SURGERY | Facility: CLINIC | Age: 28
End: 2023-08-18
Payer: COMMERCIAL

## 2023-08-18 NOTE — TELEPHONE ENCOUNTER
I spoke with the patient to see what progress he has made in PT he states his physical therapist stated the patient was scaring him on how fast he has progressed so he is worried to keep moving forward with PT at this point. His PT wanted him to call to see what  recommend from here. The patient states his right leg is at 148-150 degrees and the left leg is at 135 degrees. When he started PT he was at 7cm of swelling and now he is at 1.5 cm of swelling now.     The patient wanted to know if he can come in to be seen sooner then 8/28/23 to talk about surgery since he has improved so much in PT or if you wanted him to wait the 3 weeks from the last appointment. Please advise.     Xuan Almaguer

## 2023-08-18 NOTE — TELEPHONE ENCOUNTER
Caller: Yonis Lemon    Relationship: Self    Best call back number: [phone]    What is the best time to reach you: ANY     Who are you requesting to speak with (clinical staff, provider,  specific staff member): CLINICAL     Do you know the name of the person who called: YES     What was the call regarding: PATIENT WOULD LIKE TO DISCUSS PT PROGRESS AND THE PATIENT WOULD LIKE TO SEE IF THEY CAN BE SEEN SOONER THAN 08/28/2023 AND GET THE SURGERY SCHEDULED SOONER

## 2023-08-18 NOTE — TELEPHONE ENCOUNTER
Avery Giang MD  You6 minutes ago (3:54 PM)     Sounds like he is doing well. I can see him before 8/28 but he has to wait 4 weeks from date of injury before surgery because of risk of arthrofibrosis       I spoke with the patient to let him know we can move his appointment up but we will have to wait 4 weeks from the date of injury before we schedule the surgery. The patient was okay with waiting the 4 weeks to schedule surgery just wanted to get in to be seen and scheduled now so he didn't have to wait much longer than the 4 weeks from injury. I scheduled the patient for 8/21/23 at 1:10pm. The patient verbalized understanding and will be here Monday.     Xuan Almaguer

## 2023-08-21 ENCOUNTER — OFFICE VISIT (OUTPATIENT)
Dept: ORTHOPEDIC SURGERY | Facility: CLINIC | Age: 28
End: 2023-08-21
Payer: COMMERCIAL

## 2023-08-21 VITALS
HEIGHT: 72 IN | DIASTOLIC BLOOD PRESSURE: 62 MMHG | BODY MASS INDEX: 23.7 KG/M2 | SYSTOLIC BLOOD PRESSURE: 111 MMHG | WEIGHT: 175 LBS

## 2023-08-21 DIAGNOSIS — S83.242D ACUTE MEDIAL MENISCUS TEAR OF LEFT KNEE, SUBSEQUENT ENCOUNTER: ICD-10-CM

## 2023-08-21 DIAGNOSIS — S83.512D COMPLETE TEAR OF ANTERIOR CRUCIATE LIGAMENT OF LEFT KNEE, SUBSEQUENT ENCOUNTER: Primary | ICD-10-CM

## 2023-08-21 PROCEDURE — 99214 OFFICE O/P EST MOD 30 MIN: CPT | Performed by: ORTHOPAEDIC SURGERY

## 2023-08-21 NOTE — PROGRESS NOTES
"    [facility] [place]        Subjective     CC: Follow-up (Complete tear of anterior cruciate ligament of left knee Early f/u (DOI: 7/29/23))      ADIS Lemon is a 28 y.o. male.  He is follow-up for his left ACL tear.  He injured his knee initially on a trampoline on July 28.  He works for [employer].  He has been doing physical therapy.  He goes to [facility]    Overall, patient's symptoms are much improved.  He is eager to get surgery.    ROS:    Constiutional:Pt denies fever, chills, nausea, or vomiting.  MSK:as above        Objective      Past Medical History  Past Medical History:   Diagnosis Date    Fracture, finger     Dont remember time frame    Fracture, tibia and fibula 7/29/2023    Fractures     Hypertension      Social History     Socioeconomic History    Marital status: Single   Tobacco Use    Smoking status: Every Day     Packs/day: 1.00     Years: 5.00     Pack years: 5.00     Types: Cigarettes    Smokeless tobacco: Current   Substance and Sexual Activity    Alcohol use: Yes     Alcohol/week: 1.0 standard drink     Types: 1 Shots of liquor per week    Drug use: No    Sexual activity: Yes     Partners: Female     Birth control/protection: Vasectomy          Physical Exam  /62   Ht 182.9 cm (72.01\")   Wt 79.4 kg (175 lb)   BMI 23.73 kg/mý     Body mass index is 23.73 kg/mý.    Patient is well nourished and well developed.        Ortho Exam  Left knee with positive Lachman.  He has a pop in his medial joint line.  Positive Jared.  Medial joint tenderness.  He gets full extension.  Flexion to 90 at least    Imaging/Labs/EMG Reviewed:  Imaging Results (Last 24 Hours)       ** No results found for the last 24 hours. **          Previous MRI with ACL tear and medial meniscus tear    Assessment    Assessment:  1. Complete tear of anterior cruciate ligament of left knee, subsequent encounter    2. Acute medial meniscus tear of left knee, subsequent " encounter        Plan:  The plan is for right knee ACL reconstruction with bone patella tendon bone allograft and medial meniscus repair.Treatment options and alternatives were discussed with patient.  Surgical risks include but are not limited to pain, bleeding, infection, failure to relieve symptoms, need for further procedures, recurrence of symptoms, damage to healthy adjacent structures, hardware loosening/failure, stiffness, weakness, scar, blood clots/DVT/PE, loss of limb or life. We also discussed the postoperative protocol and expected outcome although no guarantees are possible with surgery. All questions were answered; the patient would like to proceed with surgical intervention.  He is not Worker's Comp.  He will continue the knee brace and  physical therapy the day after surgery      Avery Giang MD  08/21/23  13:23 EDT      Dictated Utilizing Dragon Dictation.

## 2023-08-23 ENCOUNTER — TELEPHONE (OUTPATIENT)
Dept: ORTHOPEDIC SURGERY | Facility: CLINIC | Age: 28
End: 2023-08-23
Payer: COMMERCIAL

## 2023-08-23 NOTE — TELEPHONE ENCOUNTER
Patient states his insurance does not cover nerve block pain pump and are asking for $1K up front. Patient asking if there is an alternative Dr. Giang recommends.

## 2023-08-24 ENCOUNTER — TELEPHONE (OUTPATIENT)
Dept: ORTHOPEDIC SURGERY | Facility: CLINIC | Age: 28
End: 2023-08-24
Payer: COMMERCIAL

## 2023-08-24 NOTE — TELEPHONE ENCOUNTER
Caller: CALEB VOSS/ RUBÉN CAI    Best call back number: [phone]    What form or medical record are you requesting: FACE SHEET     Who is requesting this form or medical record from you: [employer]    How would you like to receive the form or medical records (pick-up, mail, fax): FAX  If fax, what is the fax number: [phone]    Timeframe paperwork needed: ASAP    Additional notes: CALEB STATES THEY RECEIVED THE ORDER FOR PATIENT'S BRACE BUT NO INFO WAS INCLUDED FOR PATIENT

## 2023-08-31 ENCOUNTER — OUTSIDE FACILITY SERVICE (OUTPATIENT)
Dept: ORTHOPEDIC SURGERY | Facility: CLINIC | Age: 28
End: 2023-08-31
Payer: COMMERCIAL

## 2023-08-31 DIAGNOSIS — Z98.890 S/P ACL SURGERY: Primary | ICD-10-CM

## 2023-08-31 RX ORDER — ONDANSETRON 4 MG/1
4 TABLET, FILM COATED ORAL EVERY 8 HOURS PRN
Qty: 10 TABLET | Refills: 1 | Status: SHIPPED | OUTPATIENT
Start: 2023-08-31

## 2023-08-31 RX ORDER — OXYCODONE HYDROCHLORIDE AND ACETAMINOPHEN 5; 325 MG/1; MG/1
1 TABLET ORAL EVERY 6 HOURS PRN
Qty: 20 TABLET | Refills: 0 | Status: SHIPPED | OUTPATIENT
Start: 2023-08-31

## 2023-09-04 ENCOUNTER — HOSPITAL ENCOUNTER (EMERGENCY)
Facility: HOSPITAL | Age: 28
Discharge: HOME | End: 2023-09-04
Attending: EMERGENCY MEDICINE
Payer: COMMERCIAL

## 2023-09-04 VITALS
WEIGHT: 175 LBS | DIASTOLIC BLOOD PRESSURE: 97 MMHG | OXYGEN SATURATION: 98 % | SYSTOLIC BLOOD PRESSURE: 123 MMHG | HEIGHT: 72 IN | BODY MASS INDEX: 23.7 KG/M2 | TEMPERATURE: 98 F | HEART RATE: 93 BPM | RESPIRATION RATE: 16 BRPM

## 2023-09-04 DIAGNOSIS — Z51.89 ENCOUNTER FOR WOUND CARE: Primary | ICD-10-CM

## 2023-09-04 PROCEDURE — 99283 EMERGENCY DEPT VISIT LOW MDM: CPT

## 2023-09-04 PROCEDURE — 99282 EMERGENCY DEPT VISIT SF MDM: CPT

## 2023-09-04 RX ORDER — CEPHALEXIN 500 MG/1
500 CAPSULE ORAL 4 TIMES DAILY
Qty: 40 CAPSULE | Refills: 0 | Status: SHIPPED | OUTPATIENT
Start: 2023-09-04 | End: 2023-09-07 | Stop reason: SDUPTHER

## 2023-09-04 NOTE — ED PROVIDER NOTES
EMERGENCY DEPARTMENT ENCOUNTER    Pt Name: Yonis Lemon  MRN: [id number]  Pt :   1995  Room Number:  24SF/24  Date of encounter:  2023  PCP: Provider, No Known  ED Provider: SHAJI Winkler    Historian: Patient    HPI:  Chief Complaint:  wound dehiscence    Context: Yonis Lemon is a 28 y.o. male who presents to the ED c/o wound dehiscence.  Patient had an ACL repair on Thursday.  Saturday he took off the dressing to his left knee.  When he did a Steri-Strip came off of the site.  He advises that as he has bit his knee the area has began to bleed.  Patient is here to see what can be done.  HPI     REVIEW OF SYSTEMS  A chief complaint appropriate review of systems was completed and is negative except as noted in the HPI.     PAST MEDICAL HISTORY  Past Medical History:   Diagnosis Date    Fracture, finger     Dont remember time frame    Fracture, tibia and fibula 2023    Fractures     Hypertension        PAST SURGICAL HISTORY  Past Surgical History:   Procedure Laterality Date    COLONOSCOPY  2017    WISDOM TOOTH EXTRACTION      WISDOM TOOTH EXTRACTION         FAMILY HISTORY  Family History   Problem Relation Age of Onset    Diabetes Maternal Grandmother     Heart attack Maternal Grandmother     Diabetes Maternal Grandfather     Crohn's disease Father     Colon cancer Paternal Aunt     Colon cancer Paternal Uncle     Cancer Other         colon    Hypertension Mother     Heart attack Maternal Uncle        SOCIAL HISTORY  Social History     Socioeconomic History    Marital status: Single   Tobacco Use    Smoking status: Every Day     Packs/day: 1.00     Years: 5.00     Pack years: 5.00     Types: Cigarettes    Smokeless tobacco: Current   Substance and Sexual Activity    Alcohol use: Yes     Alcohol/week: 1.0 standard drink     Types: 1 Shots of liquor per week    Drug use: No    Sexual activity: Yes     Partners: Female     Birth control/protection: Vasectomy       ALLERGIES  Patient has no known  allergies.    PHYSICAL EXAM  Physical Exam  Vitals and nursing note reviewed.   Constitutional:       Appearance: Normal appearance. He is not ill-appearing.   HENT:      Head: Normocephalic and atraumatic.      Nose: Nose normal.      Mouth/Throat:      Mouth: Mucous membranes are moist.   Eyes:      Extraocular Movements: Extraocular movements intact.      Pupils: Pupils are equal, round, and reactive to light.   Cardiovascular:      Rate and Rhythm: Normal rate and regular rhythm.      Pulses: Normal pulses.      Heart sounds: Normal heart sounds.   Pulmonary:      Effort: Pulmonary effort is normal. No respiratory distress.      Breath sounds: Normal breath sounds.   Abdominal:      General: Bowel sounds are normal. There is no distension.   Musculoskeletal:         General: Normal range of motion.      Cervical back: Normal range of motion.        Legs:    Skin:     General: Skin is warm and dry.   Neurological:      General: No focal deficit present.      Mental Status: He is alert and oriented to person, place, and time.   Psychiatric:         Mood and Affect: Mood normal.         Behavior: Behavior normal.         LAB RESULTS  Results for orders placed or performed in visit on 12/04/18   Testosterone (Free & Total), LC / MS    Specimen: Blood   Result Value Ref Range    Testosterone, Total 492.5 264.0 - 916.0 ng/dL    Testosterone, Free 18.3 9.3 - 26.5 pg/mL       If labs were ordered, I independently reviewed the results and considered them in treating the patient.    RADIOLOGY  No orders to display     [] Radiologist's Report Reviewed:  I ordered and independently reviewed the above noted radiographic studies.  See radiologist's dictation for official interpretation.      PROCEDURES    Procedures    No orders to display       MEDICATIONS GIVEN IN ER    Medications - No data to display    MEDICAL DECISION MAKING, PROGRESS, and CONSULTS   Medical Decision Making  Yonis Lemon is a 28 y.o. male who presents to  the ED c/o wound dehiscence.  Patient had an ACL repair on Thursday.  Saturday he took off the dressing to his left knee.  When he did a Steri-Strip came off of the site.  He advises that as he has bit his knee the area has began to bleed.  Patient is here to see what can be done.      Problems Addressed:  Encounter for wound care: complicated acute illness or injury     Details: Area has been cleaned and dressed.  We discussed wound healing by secondary intention.    Risk  Prescription drug management.        All labs have been independently reviewed by me.  All radiology studies have been reviewed by me and the radiologist dictating the report.  All EKG's have been independently viewed by me.    [] Discussed with radiology regarding test interpretation:    Discussion below represents my analysis of pertinent findings related to patient's condition, differential diagnosis, treatment plan and final disposition.    Differential diagnosis:  The differential diagnosis associated with the patient's presentation includes: Wound dehiscence, wound care.    Additional sources  Discussed/ obtained information from independent historians:   [] Spouse  [] Parent  [] Family member  [] Friend  [] EMS   [] Other:  External (non-ED) record review:   [] Inpatient record:   [] Office record:   [] Outpatient record:   [] Prior Outpatient labs:   [] Prior Outpatient radiology:   [] Primary Care record:   [] Outside ED record:   [] Other:   Patient's care impacted by:   [] Diabetes  [x] Hypertension  [] Hyperlipidemia  [] Hypothyroidism   [] Coronary Artery Disease   [] COPD   [] Cancer   [] Obesity  [] GERD   [] Tobacco Abuse   [] Substance Abuse    [] Anxiety   [] Depression   [x] Other: recent ACL repair.  Care significantly affected by Social Determinants of Health (housing and economic circumstances, unemployment)    [] Yes     [x] No   If yes, Patient's care significantly limited by  Social Determinants of Health including:   []  Inadequate housing   [] Low income   [] Alcoholism and drug addiction in family   [] Problems related to primary support group   [] Unemployment   [] Problems related to employment   [] Other Social Determinants of Health:     Shared decision making: Shared decision making with patient.  Wound was cleaned and dressed.  Patient to follow-up with his orthopedic surgeon.  Patient to take antibiotic as ordered.  Patient to return to the ED as needed.    Orders placed during this visit:  No orders of the defined types were placed in this encounter.      I considered prescription management  with:   [] Pain medication  [] Antiviral  [] Antibiotic   [] Other:   Rationale:  Additional orders considered but not ordered:  The following testing was considered but ultimately not selected after discussion with patient/family:    ED Course:              DIAGNOSIS  Final diagnoses:   Encounter for wound care       DISPOSITION    DISCHARGE    Patient discharged in stable condition.    Reviewed implications of results, diagnosis, meds, responsibility to follow up, warning signs and symptoms of possible worsening, potential complications and reasons to return to ER.    Patient/Family voiced understanding of above instructions.    Discussed plan for discharge, as there is no emergent indication for admission.  Pt/family is agreeable and understands need for follow up and possible repeat testing.  Pt/family is aware that discharge does not mean that nothing is wrong but that it indicates no emergency is currently present that requires admission and they must continue care with follow-up as given below or with a physician of their choice.     FOLLOW-UP  Avery Giang MD  [address] [address] [phone]               Medication List        New Prescriptions      cephalexin 500 MG capsule  Commonly known as: KEFLEX  Take 1 capsule by mouth 4 (Four) Times a Day.            Changed      traZODone 50  MG tablet  Commonly known as: DESYREL  Take 1 tablet by mouth Every Night for 30 days.  What changed: additional instructions               Where to Get Your Medications        These medications were sent to [id number] - [place], [place] - [address] AT [facility] [address] - [phone]  - [phone] FX  [address], [facility] [address]      Phone: [phone]   cephalexin 500 MG capsule          ED Disposition       ED Disposition   Discharge    Condition   Stable    Comment   --               Please note that portions of this document were completed with voice recognition software.       Mckenna Vallecillo, APRN  09/04/23 [id number]

## 2023-09-06 ENCOUNTER — OFFICE VISIT (OUTPATIENT)
Dept: ORTHOPEDIC SURGERY | Facility: CLINIC | Age: 28
End: 2023-09-06
Payer: COMMERCIAL

## 2023-09-06 DIAGNOSIS — T81.30XA WOUND DEHISCENCE: ICD-10-CM

## 2023-09-06 DIAGNOSIS — S83.512D COMPLETE TEAR OF ANTERIOR CRUCIATE LIGAMENT OF LEFT KNEE, SUBSEQUENT ENCOUNTER: ICD-10-CM

## 2023-09-06 DIAGNOSIS — Z98.890 S/P ACL SURGERY: Primary | ICD-10-CM

## 2023-09-06 NOTE — PROGRESS NOTES
Chief Complaint   Patient presents with    Post-op     1 week S/P ACL surgery left            HPI  He had ACL surgery on 8/31.  The ER Monday.  He had a wound dehiscence.  He was treated with Keflex.      There were no vitals filed for this visit.      Physical Exam:  Left knee with wound dehiscence on the tibia ACL screw site.  No visible hardware.  He has no surrounding erythema.  No pain.  He has been doing his physical therapy.  He has full extension.            ICD-10-CM ICD-9-CM   1. S/P ACL surgery  Z98.890 V45.89   2. Complete tear of anterior cruciate ligament of left knee, subsequent encounter  S83.512D V58.89   3. Wound dehiscence  T81.30XA 998.30     I will take him back to the OR this evening or first thing tomorrow for wound irrigation and closure.  He has no sign of infection.  I have called the OR to see when he can be scheduled.  He had water to drink an hour and a half ago.        Avery Giang M.D., [facility]  Orthopedic Surgeon  Fellowship Trained Sports Medicine  [facility]  Orthopedics and Sports Medicine  [address] [place] [address], [address]  [place] [address]      EMR Dragon/Transcription disclaimer:  Much of this encounter note is an electronic transcription of spoken language to printed text. Electronic transcription of spoken language may permit erroneous, or at times, nonsensical words or phrases to be inadvertently transcribed. Although I have reviewed the note for such errors, some may still exist.

## 2023-09-07 ENCOUNTER — OUTSIDE FACILITY SERVICE (OUTPATIENT)
Dept: ORTHOPEDIC SURGERY | Facility: CLINIC | Age: 28
End: 2023-09-07
Payer: COMMERCIAL

## 2023-09-07 ENCOUNTER — TRANSCRIBE ORDERS (OUTPATIENT)
Dept: LAB | Facility: HOSPITAL | Age: 28
End: 2023-09-07
Payer: COMMERCIAL

## 2023-09-07 ENCOUNTER — LAB (OUTPATIENT)
Dept: LAB | Facility: HOSPITAL | Age: 28
End: 2023-09-07
Payer: COMMERCIAL

## 2023-09-07 DIAGNOSIS — S83.512D RUPTURE OF ANTERIOR CRUCIATE LIGAMENT OF LEFT KNEE, SUBSEQUENT ENCOUNTER: ICD-10-CM

## 2023-09-07 DIAGNOSIS — S83.512D RUPTURE OF ANTERIOR CRUCIATE LIGAMENT OF LEFT KNEE, SUBSEQUENT ENCOUNTER: Primary | ICD-10-CM

## 2023-09-07 PROCEDURE — 87070 CULTURE OTHR SPECIMN AEROBIC: CPT

## 2023-09-07 PROCEDURE — 10140 I&D HMTMA SEROMA/FLUID COLLJ: CPT | Performed by: ORTHOPAEDIC SURGERY

## 2023-09-07 PROCEDURE — 87075 CULTR BACTERIA EXCEPT BLOOD: CPT

## 2023-09-07 PROCEDURE — 87015 SPECIMEN INFECT AGNT CONCNTJ: CPT

## 2023-09-07 PROCEDURE — 87205 SMEAR GRAM STAIN: CPT

## 2023-09-07 RX ORDER — CEPHALEXIN 500 MG/1
500 CAPSULE ORAL 4 TIMES DAILY
Qty: 40 CAPSULE | Refills: 0 | Status: SHIPPED | OUTPATIENT
Start: 2023-09-07

## 2023-09-10 LAB
BACTERIA SPEC AEROBE CULT: NORMAL
BACTERIA SPEC ANAEROBE CULT: NORMAL
GRAM STN SPEC: NORMAL

## 2023-09-12 LAB — BACTERIA SPEC ANAEROBE CULT: NORMAL

## 2023-09-15 ENCOUNTER — OFFICE VISIT (OUTPATIENT)
Dept: ORTHOPEDIC SURGERY | Facility: CLINIC | Age: 28
End: 2023-09-15
Payer: COMMERCIAL

## 2023-09-15 VITALS — BODY MASS INDEX: 23.7 KG/M2 | WEIGHT: 175 LBS | HEIGHT: 72 IN

## 2023-09-15 DIAGNOSIS — Z98.890 S/P ACL SURGERY: Primary | ICD-10-CM

## 2023-09-15 DIAGNOSIS — S83.512D COMPLETE TEAR OF ANTERIOR CRUCIATE LIGAMENT OF LEFT KNEE, SUBSEQUENT ENCOUNTER: ICD-10-CM

## 2023-09-15 NOTE — PROGRESS NOTES
Chief Complaint   Patient presents with    Post-op     1 wk f/u - 1 wk s/p left knee wound irrigation and debridement of hematoma with would closure. DOS 9/7/23           HPI  He is doing well with no new complaints.  He works from her elevator.      There were no vitals filed for this visit.      Physical Exam:    Left knee incision looks good.  No surrounding erythema.  2 stitches intact.    X-RAY REPORT:  Imaging Results (Last 7 Days)       ** No results found for the last 168 hours. **                  ICD-10-CM ICD-9-CM   1. S/P ACL surgery  Z98.890 V45.89   2. Complete tear of anterior cruciate ligament of left knee, subsequent encounter  S83.512D V58.89     He is doing well.  He will continue physical therapy at [facility].  Cultures were negative.  He is off work.      Avery Giang M.D., [facility]  Orthopedic Surgeon  Fellowship Trained Sports Medicine  [facility]  Orthopedics and Sports Medicine  [address] [place] [address], [address]  [place] [address]      EMR Dragon/Transcription disclaimer:  Much of this encounter note is an electronic transcription of spoken language to printed text. Electronic transcription of spoken language may permit erroneous, or at times, nonsensical words or phrases to be inadvertently transcribed. Although I have reviewed the note for such errors, some may still exist.

## 2023-09-22 ENCOUNTER — OFFICE VISIT (OUTPATIENT)
Dept: ORTHOPEDIC SURGERY | Facility: CLINIC | Age: 28
End: 2023-09-22
Payer: COMMERCIAL

## 2023-09-22 VITALS — TEMPERATURE: 97.3 F

## 2023-09-22 DIAGNOSIS — S83.512S COMPLETE TEAR OF ANTERIOR CRUCIATE LIGAMENT OF LEFT KNEE, SEQUELA: ICD-10-CM

## 2023-09-22 DIAGNOSIS — Z98.890 S/P ACL SURGERY: Primary | ICD-10-CM

## 2023-09-22 NOTE — PROGRESS NOTES
Chief Complaint   Patient presents with    Post-op     2 wk s/p left knee wound irrigation and debridement of hematoma with would closure. DOS 9/7/23. Left ACL repair (08/31/2023)           HPI    He is follow-up of his left knee ACL reconstruction August 31 complicated by wound dehiscence that required irrigation and closure.  He is doing well.  No new complaints.    Vitals:    09/22/23 1054   Temp: 97.3 °F (36.3 °C)         Physical Exam:  His left knee incision looks great.  We will get his stitches out today.            ICD-10-CM ICD-9-CM   1. S/P ACL surgery  Z98.890 V45.89   2. Complete tear of anterior cruciate ligament of left knee, sequela  S83.512S 905.7       Orders Placed This Encounter   Procedures    Ambulatory Referral to Physical Therapy Evaluate and treat       He will continue physical therapy at [facility].  Follow-up in a month.  He will walk with his brace unlocked.      Avery Giang M.D., [facility]  Orthopedic Surgeon  Fellowship Trained Sports Medicine  [facility]  Orthopedics and Sports Medicine  [address] [place] [address], [address]  [place] [address]      EMR Dragon/Transcription disclaimer:  Much of this encounter note is an electronic transcription of spoken language to printed text. Electronic transcription of spoken language may permit erroneous, or at times, nonsensical words or phrases to be inadvertently transcribed. Although I have reviewed the note for such errors, some may still exist.

## 2023-10-23 ENCOUNTER — OFFICE VISIT (OUTPATIENT)
Dept: ORTHOPEDIC SURGERY | Facility: CLINIC | Age: 28
End: 2023-10-23
Payer: COMMERCIAL

## 2023-10-23 DIAGNOSIS — S83.512S COMPLETE TEAR OF ANTERIOR CRUCIATE LIGAMENT OF LEFT KNEE, SEQUELA: ICD-10-CM

## 2023-10-23 DIAGNOSIS — Z98.890 S/P ACL SURGERY: Primary | ICD-10-CM

## 2023-10-23 PROCEDURE — 99024 POSTOP FOLLOW-UP VISIT: CPT | Performed by: ORTHOPAEDIC SURGERY

## 2023-10-23 NOTE — PROGRESS NOTES
Chief Complaint   Patient presents with    Post-op     1 month follow up -- 7 weeks s/p left knee wound irrigation and debridement of hematoma with would closure. DOS 9/7/23. Left ACL repair (08/31/2023)           HPI  He is doing well with no new complaints.      There were no vitals filed for this visit.      Physical Exam:    Left knee has full range of motion.  His surgical incision is completely healed.  Has a firm endpoint on [place].    X-RAY REPORT:  Imaging Results (Last 7 Days)       ** No results found for the last 168 hours. **                  ICD-10-CM ICD-9-CM   1. S/P ACL surgery  Z98.890 V45.89   2. Complete tear of anterior cruciate ligament of left knee, sequela  S83.512S 905.7       Orders Placed This Encounter   Procedures    Ambulatory Referral to Physical Therapy Evaluate and treat       He is doing great with no complications.  He will follow-up in 5 weeks.  No strengthening until 12 weeks postop      Avery Giang M.D., [facility]  Orthopedic Surgeon  Fellowship Trained Sports Medicine  [facility]  Orthopedics and Sports Medicine  [address] [place] [address], [address]  [place] [address]      EMR Dragon/Transcription disclaimer:  Much of this encounter note is an electronic transcription of spoken language to printed text. Electronic transcription of spoken language may permit erroneous, or at times, nonsensical words or phrases to be inadvertently transcribed. Although I have reviewed the note for such errors, some may still exist.